# Patient Record
Sex: MALE | Race: WHITE | Employment: STUDENT | URBAN - METROPOLITAN AREA
[De-identification: names, ages, dates, MRNs, and addresses within clinical notes are randomized per-mention and may not be internally consistent; named-entity substitution may affect disease eponyms.]

---

## 2023-11-15 ENCOUNTER — HOSPITAL ENCOUNTER (EMERGENCY)
Age: 18
Discharge: PSYCHIATRIC OR SUBSTANCE USE FACILITY | End: 2023-11-16
Attending: EMERGENCY MEDICINE
Payer: COMMERCIAL

## 2023-11-15 DIAGNOSIS — R45.851 SUICIDAL IDEATION: ICD-10-CM

## 2023-11-15 DIAGNOSIS — F39 MOOD DISORDER (HCC): Primary | ICD-10-CM

## 2023-11-15 PROCEDURE — 99285 EMERGENCY DEPT VISIT HI MDM: CPT

## 2023-11-15 PROCEDURE — G0480 DRUG TEST DEF 1-7 CLASSES: HCPCS

## 2023-11-15 PROCEDURE — 85025 COMPLETE CBC W/AUTO DIFF WBC: CPT

## 2023-11-15 PROCEDURE — 80053 COMPREHEN METABOLIC PANEL: CPT

## 2023-11-16 VITALS
OXYGEN SATURATION: 97 % | DIASTOLIC BLOOD PRESSURE: 80 MMHG | TEMPERATURE: 98.2 F | BODY MASS INDEX: 25.71 KG/M2 | HEIGHT: 66 IN | SYSTOLIC BLOOD PRESSURE: 121 MMHG | HEART RATE: 61 BPM | WEIGHT: 160 LBS | RESPIRATION RATE: 19 BRPM

## 2023-11-16 LAB
ACETAMINOPHEN LEVEL: <5 UG/ML (ref 15–30)
ALBUMIN SERPL-MCNC: 4.5 GM/DL (ref 3.4–5)
ALCOHOL SCREEN SERUM: <0.01 %WT/VOL
ALP BLD-CCNC: 62 IU/L (ref 40–129)
ALT SERPL-CCNC: 22 U/L (ref 10–40)
AMPHETAMINES: NEGATIVE
ANION GAP SERPL CALCULATED.3IONS-SCNC: 8 MMOL/L (ref 4–16)
AST SERPL-CCNC: 21 IU/L (ref 15–37)
BARBITURATE SCREEN URINE: NEGATIVE
BASOPHILS ABSOLUTE: 0.1 K/CU MM
BASOPHILS RELATIVE PERCENT: 0.7 % (ref 0–1)
BENZODIAZEPINE SCREEN, URINE: NEGATIVE
BILIRUB SERPL-MCNC: 0.2 MG/DL (ref 0–1)
BILIRUBIN URINE: NEGATIVE MG/DL
BLOOD, URINE: NEGATIVE
BUN SERPL-MCNC: 13 MG/DL (ref 6–23)
CALCIUM SERPL-MCNC: 9.5 MG/DL (ref 8.3–10.6)
CANNABINOID SCREEN URINE: ABNORMAL
CHLORIDE BLD-SCNC: 101 MMOL/L (ref 99–110)
CLARITY: CLEAR
CO2: 27 MMOL/L (ref 21–32)
COCAINE METABOLITE: NEGATIVE
COLOR: YELLOW
COMMENT UA: NORMAL
CREAT SERPL-MCNC: 0.7 MG/DL (ref 0.6–1.1)
DIFFERENTIAL TYPE: ABNORMAL
DOSE AMOUNT: ABNORMAL
DOSE AMOUNT: ABNORMAL
DOSE TIME: ABNORMAL
DOSE TIME: ABNORMAL
EOSINOPHILS ABSOLUTE: 0.1 K/CU MM
EOSINOPHILS RELATIVE PERCENT: 1.3 % (ref 0–3)
FENTANYL URINE: NEGATIVE
GFR SERPL CREATININE-BSD FRML MDRD: >60 ML/MIN/1.73M2
GLUCOSE SERPL-MCNC: 98 MG/DL (ref 70–99)
GLUCOSE, URINE: NEGATIVE MG/DL
HCT VFR BLD CALC: 41.6 % (ref 37–47)
HEMOGLOBIN: 13.7 GM/DL (ref 12.5–16)
IMMATURE NEUTROPHIL %: 0.3 % (ref 0–0.43)
KETONES, URINE: NEGATIVE MG/DL
LEUKOCYTE ESTERASE, URINE: NEGATIVE
LYMPHOCYTES ABSOLUTE: 2.9 K/CU MM
LYMPHOCYTES RELATIVE PERCENT: 32.8 % (ref 24–44)
MCH RBC QN AUTO: 29.5 PG (ref 27–31)
MCHC RBC AUTO-ENTMCNC: 32.9 % (ref 32–36)
MCV RBC AUTO: 89.5 FL (ref 78–100)
MONOCYTES ABSOLUTE: 0.8 K/CU MM
MONOCYTES RELATIVE PERCENT: 9.1 % (ref 0–4)
NITRITE URINE, QUANTITATIVE: NEGATIVE
NUCLEATED RBC %: 0 %
OPIATES, URINE: NEGATIVE
OXYCODONE: NEGATIVE
PDW BLD-RTO: 11.9 % (ref 11.7–14.9)
PH, URINE: 7 (ref 5–8)
PLATELET # BLD: 283 K/CU MM (ref 140–440)
PMV BLD AUTO: 9.2 FL (ref 7.5–11.1)
POTASSIUM SERPL-SCNC: 4.2 MMOL/L (ref 3.5–5.1)
PROTEIN UA: NEGATIVE MG/DL
RBC # BLD: 4.65 M/CU MM (ref 4.2–5.4)
SALICYLATE LEVEL: 1 MG/DL (ref 15–30)
SARS-COV-2 RDRP RESP QL NAA+PROBE: NOT DETECTED
SEGMENTED NEUTROPHILS ABSOLUTE COUNT: 4.9 K/CU MM
SEGMENTED NEUTROPHILS RELATIVE PERCENT: 55.8 % (ref 36–66)
SODIUM BLD-SCNC: 136 MMOL/L (ref 135–145)
SOURCE: NORMAL
SPECIFIC GRAVITY UA: <1.005 (ref 1–1.03)
TOTAL IMMATURE NEUTOROPHIL: 0.03 K/CU MM
TOTAL NUCLEATED RBC: 0 K/CU MM
TOTAL PROTEIN: 7.7 GM/DL (ref 6.4–8.2)
UROBILINOGEN, URINE: 0.2 MG/DL (ref 0.2–1)
WBC # BLD: 8.7 K/CU MM (ref 4–10.5)

## 2023-11-16 PROCEDURE — 87635 SARS-COV-2 COVID-19 AMP PRB: CPT

## 2023-11-16 PROCEDURE — 81003 URINALYSIS AUTO W/O SCOPE: CPT

## 2023-11-16 PROCEDURE — 80307 DRUG TEST PRSMV CHEM ANLYZR: CPT

## 2023-11-16 ASSESSMENT — PAIN SCALES - GENERAL: PAINLEVEL_OUTOF10: 4

## 2023-11-16 ASSESSMENT — PAIN DESCRIPTION - LOCATION: LOCATION: BACK

## 2023-11-16 ASSESSMENT — PAIN DESCRIPTION - DESCRIPTORS: DESCRIPTORS: SORE

## 2023-11-16 ASSESSMENT — PAIN DESCRIPTION - PAIN TYPE: TYPE: CHRONIC PAIN

## 2023-11-16 NOTE — ED NOTES
Called [facility] spoke to Unique notified her of Covid results states she will call [facility]

## 2023-11-16 NOTE — VIRTUAL HEALTH
Telepsych  Crisis Assessment       Chief Complaint: Pt reports feeling suicidal. States \"They don't ever go away. \"    Initial Diagnosis: Major depressive disorder     Voluntary/Involuntary Status: High Risk    Guardian/POA: Legal Guardians  Twila Aguilera - [phone]  Belen Aguilera - [phone]    C-SSRS Risk (High, Moderate, Low): High Risk (Provider aware per 11/16/23 0021 RN note)    Mental Status Exam:     Sensorium  oriented to time, place and person, lethargic   Orientation place and situation   Relations guarded and vague   Eye Contact poor   Appearance:  age appropriate   Motor Behavior:  Sitting   Speech:  soft   Vocabulary average   Thought Process: concrete and goal directed   Thought Content no hallucinations and no delusions   Suicidal ideations death wishes   Homicidal ideations none   Mood:  constricted, depressed, and sad   Affect:  flat and mood-congruent   Memory recent  adequate   Memory remote:  adequate   Concentration:  adequate   Abstraction:  concrete   Insight:  fair   Reliability Difficult to assess    Judgment:  age appropriate     Medical/MH/Substance Use Treatment: Pt on Focalin, Prozac and Ambien. No prior psychiatric hospitalizations. Substance Use: \"just pot\"    Trauma/Abuse: pt denies     Violence: pt denies     Legal: pt denies     Access to Weapons: pt denies     Risk Factors: multiple health issues     Protective Factors: has support system    Support system: Parents    Brief Summary: 19year old patient, per records with phx of Anxiety, Attention deficit hyperactivity disorder, inattentive type,  Autism spectrum disorder, Chronic pain disorder, Depression,  Gender dysphoria, insomnia,MARGARITA    Pt very guarded, flat affect, mostly one word responses.      Pt declines for writer to talk to anyone for collateral.     Capacity to Respond to Treatment: Pt states he has done therapy before and has capacity to engage and respond     Safety Plan: reviewed     Disposition/Plan of

## 2023-11-16 NOTE — ED PROVIDER NOTES
[address] ENCOUNTER      Pt Name: Jessy Mejia  MRN: [id number]  [address] [facility] 2005  Date of evaluation: 11/15/2023  Provider: Gaibno Keys MD    CHIEF COMPLAINT       Chief Complaint   Patient presents with    Suicidal         HISTORY OF PRESENT ILLNESS      Jessy Mejia is a 19 y.o. adult who presents to the emergency department  for   Chief Complaint   Patient presents with    Suicidal       27-year-old transgendered male presents to the emergency department reporting suicidality. Patient endorses a history of mood disorder and prior suicide attempt. No prior psychiatric hospitalizations. Patient does present with family at bedside. Patient reportedly told family about thoughts of wanting to harm themselves so patient presents voluntarily to the emergency department. Patient did not undertake any actions towards self-harm or harm of others. Patient is on medications for mood disorder endorses adherence with his medications. Patient does report certain stressors currently including school as well as illness and a family member. Patient does not have any somatic complaints. GCS of 15. Nursing Notes, Triage Notes & Vital Signs were reviewed. REVIEW OF SYSTEMS    (2-9 systems for level 4, 10 or more for level 5)     Review of Systems   Psychiatric/Behavioral:  Positive for suicidal ideas. Except as noted above the remainder of the review of systems was reviewed and negative. PAST MEDICAL HISTORY     Past Medical History:   Diagnosis Date    Chiari I malformation ([address])     EDS (Danny-Danlos syndrome)     POTS (postural orthostatic tachycardia syndrome)     Scoliosis        Prior to Admission medications    Medication Sig Start Date End Date Taking?  Authorizing Provider   cetirizine (ZYRTEC) 10 MG tablet Take 10 mg by mouth daily    Provider, MD Neda   magnesium (MAGNESIUM-OXIDE)

## 2023-11-16 NOTE — ED NOTES
Pt accepted to [facility] by Dr Deisy Yanes to the 9th floor. Nurse to nurse [phone]. [facility] ETA TBD.  Once Covid results call [facility]/ fax [email or web address]

## 2023-11-16 NOTE — ED TRIAGE NOTES
Patient presents to the ED with complaint of suicidal ideation. Patient states they do have a plan for how they would harm themselves but that they don't have anything accessible at this time.

## 2023-11-16 NOTE — ED NOTES
Pt presents to Ed with complaints of feeling suicidal states on going for weeks, asked about specific plan states \" I have plenty of them but nothing good enough\". Pt updated on mental health process and policies. Family at bedside.       Kerry Rodriguez RN  11/15/23 0055

## 2025-06-30 ENCOUNTER — TELEPHONE (OUTPATIENT)
Dept: PULMONOLOGY | Age: 20
End: 2025-06-30